# Patient Record
Sex: MALE | Race: WHITE | NOT HISPANIC OR LATINO | Employment: PART TIME | URBAN - METROPOLITAN AREA
[De-identification: names, ages, dates, MRNs, and addresses within clinical notes are randomized per-mention and may not be internally consistent; named-entity substitution may affect disease eponyms.]

---

## 2020-05-30 ENCOUNTER — OFFICE VISIT (OUTPATIENT)
Dept: URGENT CARE | Facility: PHYSICIAN GROUP | Age: 30
End: 2020-05-30
Payer: COMMERCIAL

## 2020-05-30 ENCOUNTER — HOSPITAL ENCOUNTER (OUTPATIENT)
Dept: RADIOLOGY | Facility: MEDICAL CENTER | Age: 30
End: 2020-05-30
Attending: FAMILY MEDICINE
Payer: COMMERCIAL

## 2020-05-30 VITALS
WEIGHT: 199.4 LBS | HEIGHT: 70 IN | BODY MASS INDEX: 28.55 KG/M2 | OXYGEN SATURATION: 94 % | HEART RATE: 80 BPM | SYSTOLIC BLOOD PRESSURE: 116 MMHG | TEMPERATURE: 98.2 F | RESPIRATION RATE: 18 BRPM | DIASTOLIC BLOOD PRESSURE: 74 MMHG

## 2020-05-30 DIAGNOSIS — M79.602 LEFT ARM PAIN: ICD-10-CM

## 2020-05-30 PROCEDURE — 73060 X-RAY EXAM OF HUMERUS: CPT | Mod: LT

## 2020-05-30 PROCEDURE — 99203 OFFICE O/P NEW LOW 30 MIN: CPT | Performed by: FAMILY MEDICINE

## 2020-05-30 ASSESSMENT — ENCOUNTER SYMPTOMS
FEVER: 0
SHORTNESS OF BREATH: 0
VOMITING: 0
FOCAL WEAKNESS: 0

## 2020-05-30 ASSESSMENT — PAIN SCALES - GENERAL: PAINLEVEL: 3=SLIGHT PAIN

## 2020-05-30 NOTE — PROGRESS NOTES
"Subjective:     Lalo Crooks is a 29 y.o. male who presents for Muscle Pain ((L) bicep pain x1 week pt states he's had pain for about a year but withing the last week pain has been too much ) and Shoulder Pain ((R) shoulder x1 month, pt states when he stretches arm sharp pain in shoulder )    HPI  Pt presents for evaluation of a new problem  Pt with left arm pain   Pain in the bicep  Pain started without fall or injury  Pain bothers him intermittently and has been going on for about a year  Pain has been constantly present for the past 1 week   Pain is much worse when lifting anything     Review of Systems   Constitutional: Negative for fever.   Respiratory: Negative for shortness of breath.    Cardiovascular: Negative for chest pain.   Gastrointestinal: Negative for vomiting.   Skin: Negative for rash.   Neurological: Negative for focal weakness.     PMH: No chronic medical problems   MEDS: No daily meds   ALLERGIES: NKDA  SURGHX: None  SOCHX: No smoke exposure   FH: Family history was reviewed, not contributing to acute illness      Objective:   /74 (BP Location: Left arm, Patient Position: Sitting, BP Cuff Size: Adult)   Pulse 80   Temp 36.8 °C (98.2 °F) (Temporal)   Resp 18   Ht 1.778 m (5' 10\")   Wt 90.4 kg (199 lb 6.4 oz)   SpO2 94%   BMI 28.61 kg/m²     Physical Exam  Constitutional:       General: He is not in acute distress.     Appearance: He is well-developed. He is not diaphoretic.   HENT:      Head: Normocephalic and atraumatic.   Musculoskeletal:      Comments: Left upper extremity   Appearance - No swelling, bruising, or erythema  Palpation - +TTP along medial and lateral upper arm between biceps and triceps.  No tenderness to palpation of triceps, biceps, olecranon, medial epicondyle, lateral epicondyle   ROM - FROM elbow and shoulder   Strength - 5/5 throughout   Neurovascular - 2+ radial pulse, sensation equal and intact bilaterally    Skin:     General: Skin is warm and " dry.      Findings: No rash.   Neurological:      Mental Status: He is alert and oriented to person, place, and time.   Psychiatric:         Behavior: Behavior normal.         Thought Content: Thought content normal.         Judgment: Judgment normal.       Assessment/Plan:   Assessment    1. Left arm pain  - DX-HUMERUS 2+ LEFT; Future  - REFERRAL TO SPORTS MEDICINE  - REFERRAL TO PHYSICAL THERAPY Reason for Therapy: Eval/Treat/Report    Patient with left arm pain intermittently.  He does have some deep tenderness, however retains full strength and range of motion.  X-ray does not show any signs of stress reaction.  Advised that physical therapy could be very helpful since this is a recurrent pain.  We will plan to follow-up with him in the sports medicine office to monitor his progress.  No indication for MRI today, however may reconsider after a few sessions of physical therapy depending on his progress.